# Patient Record
Sex: MALE | Race: WHITE | NOT HISPANIC OR LATINO
[De-identification: names, ages, dates, MRNs, and addresses within clinical notes are randomized per-mention and may not be internally consistent; named-entity substitution may affect disease eponyms.]

---

## 2018-03-19 PROBLEM — Z00.00 ENCOUNTER FOR PREVENTIVE HEALTH EXAMINATION: Status: ACTIVE | Noted: 2018-03-19

## 2018-04-18 ENCOUNTER — APPOINTMENT (OUTPATIENT)
Dept: GASTROENTEROLOGY | Facility: CLINIC | Age: 23
End: 2018-04-18
Payer: COMMERCIAL

## 2018-04-18 VITALS
DIASTOLIC BLOOD PRESSURE: 88 MMHG | HEART RATE: 71 BPM | HEIGHT: 72 IN | BODY MASS INDEX: 32.37 KG/M2 | RESPIRATION RATE: 18 BRPM | WEIGHT: 239 LBS | SYSTOLIC BLOOD PRESSURE: 124 MMHG

## 2018-04-18 DIAGNOSIS — Z82.2 FAMILY HISTORY OF DEAFNESS AND HEARING LOSS: ICD-10-CM

## 2018-04-18 DIAGNOSIS — K21.9 GASTRO-ESOPHAGEAL REFLUX DISEASE W/OUT ESOPHAGITIS: ICD-10-CM

## 2018-04-18 DIAGNOSIS — Z91.89 OTHER SPECIFIED PERSONAL RISK FACTORS, NOT ELSEWHERE CLASSIFIED: ICD-10-CM

## 2018-04-18 DIAGNOSIS — Z87.19 PERSONAL HISTORY OF OTHER DISEASES OF THE DIGESTIVE SYSTEM: ICD-10-CM

## 2018-04-18 DIAGNOSIS — Z87.898 PERSONAL HISTORY OF OTHER SPECIFIED CONDITIONS: ICD-10-CM

## 2018-04-18 DIAGNOSIS — F17.200 NICOTINE DEPENDENCE, UNSPECIFIED, UNCOMPLICATED: ICD-10-CM

## 2018-04-18 PROCEDURE — 99204 OFFICE O/P NEW MOD 45 MIN: CPT

## 2018-04-18 RX ORDER — RANITIDINE 300 MG/1
300 TABLET ORAL DAILY
Qty: 90 | Refills: 1 | Status: ACTIVE | COMMUNITY
Start: 2018-04-18 | End: 1900-01-01

## 2018-06-28 ENCOUNTER — RESULT REVIEW (OUTPATIENT)
Age: 23
End: 2018-06-28

## 2018-06-28 ENCOUNTER — OUTPATIENT (OUTPATIENT)
Dept: OUTPATIENT SERVICES | Facility: HOSPITAL | Age: 23
LOS: 1 days | End: 2018-06-28
Payer: COMMERCIAL

## 2018-06-28 ENCOUNTER — APPOINTMENT (OUTPATIENT)
Dept: GASTROENTEROLOGY | Facility: GI CENTER | Age: 23
End: 2018-06-28
Payer: COMMERCIAL

## 2018-06-28 DIAGNOSIS — K21.9 GASTRO-ESOPHAGEAL REFLUX DISEASE WITHOUT ESOPHAGITIS: ICD-10-CM

## 2018-06-28 PROCEDURE — 88342 IMHCHEM/IMCYTCHM 1ST ANTB: CPT

## 2018-06-28 PROCEDURE — 88342 IMHCHEM/IMCYTCHM 1ST ANTB: CPT | Mod: 26

## 2018-06-28 PROCEDURE — 43239 EGD BIOPSY SINGLE/MULTIPLE: CPT

## 2018-06-28 PROCEDURE — 88305 TISSUE EXAM BY PATHOLOGIST: CPT | Mod: 26

## 2018-06-28 PROCEDURE — 88305 TISSUE EXAM BY PATHOLOGIST: CPT

## 2018-07-05 LAB — SURGICAL PATHOLOGY FINAL REPORT - CH: SIGNIFICANT CHANGE UP

## 2018-09-10 ENCOUNTER — EMERGENCY (EMERGENCY)
Facility: HOSPITAL | Age: 23
LOS: 1 days | Discharge: HOME | End: 2018-09-10
Attending: EMERGENCY MEDICINE
Payer: COMMERCIAL

## 2018-09-10 VITALS
RESPIRATION RATE: 18 BRPM | OXYGEN SATURATION: 99 % | HEART RATE: 80 BPM | SYSTOLIC BLOOD PRESSURE: 147 MMHG | DIASTOLIC BLOOD PRESSURE: 88 MMHG | TEMPERATURE: 98 F

## 2018-09-10 VITALS — WEIGHT: 229.94 LBS

## 2018-09-10 PROCEDURE — 73610 X-RAY EXAM OF ANKLE: CPT | Mod: 26,LT

## 2018-09-10 PROCEDURE — 99283 EMERGENCY DEPT VISIT LOW MDM: CPT

## 2018-09-10 PROCEDURE — 73610 X-RAY EXAM OF ANKLE: CPT

## 2018-09-10 RX ORDER — IBUPROFEN 200 MG
600 TABLET ORAL ONCE
Qty: 0 | Refills: 0 | Status: COMPLETED | OUTPATIENT
Start: 2018-09-10 | End: 2018-09-10

## 2018-09-10 RX ADMIN — Medication 600 MILLIGRAM(S): at 20:17

## 2018-09-10 NOTE — ED PROVIDER NOTE - ATTENDING CONTRIBUTION TO CARE
seen with acp: well appering young male, NAD; left ankle with midl swelling and ttp over anterior talofib ligament, no malleolar ttp; +neurovasc intact; xrays negative; treat for sprain, OK for d/c

## 2018-09-10 NOTE — ED PROVIDER NOTE - PHYSICAL EXAMINATION
decreased ROM of L ankle.  Moderate swelling of the L ankle.  pulses intact.  capillary refill < 2 seconds.  tenderness to the lateral ankle and posterior ankle.

## 2018-09-10 NOTE — ED ADULT NURSE NOTE - NSIMPLEMENTINTERV_GEN_ALL_ED
Implemented All Universal Safety Interventions:  [place] to call system. Call bell, personal items and telephone within reach. Instruct patient to call for assistance. Room bathroom lighting operational. Non-slip footwear when patient is off stretcher. Physically safe environment: no spills, clutter or unnecessary equipment. Stretcher in lowest position, wheels locked, appropriate side rails in place.

## 2018-09-10 NOTE — ED ADULT NURSE NOTE - OBJECTIVE STATEMENT
Assumed care at 2015.  A+OX4, c/o left ankle pain.  twisted ankle playing soft ball today.  L ankle wrapped in ace with air cast in place by pa here. limited rom in left ankle

## 2018-09-10 NOTE — ED PROVIDER NOTE - OBJECTIVE STATEMENT
Pt is a 22y M with no PMH complaining of L ankle pain s/p playing softball and rolling it yesterday. PT states he has been limping and has tenderness along the lateral malleolus and posterior ankle. He states he heard a "pop" and a "crack". He reports going home and icing it and then taking naproxen yesterday for the pain. NKDA. No foot tenderness. There is moderate swelling of the ankle. No head trauma or LOC.

## 2019-05-27 ENCOUNTER — EMERGENCY (EMERGENCY)
Facility: HOSPITAL | Age: 24
LOS: 1 days | Discharge: HOME | End: 2019-05-27
Attending: EMERGENCY MEDICINE
Payer: COMMERCIAL

## 2019-05-27 VITALS
HEART RATE: 99 BPM | TEMPERATURE: 98 F | SYSTOLIC BLOOD PRESSURE: 130 MMHG | OXYGEN SATURATION: 97 % | WEIGHT: 225.09 LBS | RESPIRATION RATE: 18 BRPM | DIASTOLIC BLOOD PRESSURE: 74 MMHG | HEIGHT: 72 IN

## 2019-05-27 PROCEDURE — 99283 EMERGENCY DEPT VISIT LOW MDM: CPT

## 2019-05-27 PROCEDURE — 73610 X-RAY EXAM OF ANKLE: CPT

## 2019-05-27 PROCEDURE — 73610 X-RAY EXAM OF ANKLE: CPT | Mod: 26,RT

## 2019-05-27 RX ORDER — IBUPROFEN 200 MG
600 TABLET ORAL ONCE
Refills: 0 | Status: COMPLETED | OUTPATIENT
Start: 2019-05-27 | End: 2019-05-27

## 2019-05-27 RX ADMIN — Medication 600 MILLIGRAM(S): at 16:23

## 2019-05-27 NOTE — ED ADULT TRIAGE NOTE - CHIEF COMPLAINT QUOTE
Pt ambulatory in ED c/o right ankle pain s/p playing softball, states he was walking and stepped on a mitt, rolling his ankle the wrong way.

## 2019-05-27 NOTE — ED PROVIDER NOTE - ATTENDING CONTRIBUTION TO CARE
I, Raven Perez, performed a face to face bedside interview with this patient regarding history of present illness, review of symptoms and relevant past medical, social and family history.  I completed an independent physical examination. Medical decision making, follow-up on ordered tests (ie labs, radiologic studies) and re-evaluation of the patient's status has been communicated to the ACP.  Disposition of the patient will be based on test outcome and response to ED interventions.

## 2019-05-27 NOTE — ED PROVIDER NOTE - PROGRESS NOTE DETAILS
PA note: No acute fx on xray. Velcro ankle cast for ankle sprain. RICE therapy, NSAIDS for pain. Pt given crutches to assist in ambulation. Pt given f/u information for Dr. Fernando

## 2019-05-27 NOTE — ED PROVIDER NOTE - OBJECTIVE STATEMENT
Pt is a 22 y/o M, NO PMH, presents to ED c/o R ankle pain s/p fall. Pt states he was playing softball 1 hour PTA when he stepped on a baseball mitt and inverted his R ankle. Pt states he fell onto the ground after twisting ankle. Pt sustained no other injuries, did not strike his head, and is only c/o R ankle pain at this time. Pt states he has swelling to the R ankle and pain with ambulation. Pt has not taken any medication in attempt to alleviate his symptoms. Pt denies paresthesias, loss of function of leg, cold extremity.

## 2019-05-27 NOTE — ED PROVIDER NOTE - PHYSICAL EXAMINATION
MSK: TTP b/l malleolus R ankle. + inversion / eversion. 5/5 str b/l lower extrems.    Vasc: Cap refill < 2 secs, + 2 pedal pulses, foot warm    Skin: Ecchymosis R ankle.

## 2019-06-07 ENCOUNTER — APPOINTMENT (OUTPATIENT)
Dept: ORTHOPEDIC SURGERY | Facility: CLINIC | Age: 24
End: 2019-06-07
Payer: COMMERCIAL

## 2019-06-07 VITALS
HEIGHT: 72 IN | WEIGHT: 225 LBS | HEART RATE: 80 BPM | DIASTOLIC BLOOD PRESSURE: 80 MMHG | SYSTOLIC BLOOD PRESSURE: 120 MMHG | BODY MASS INDEX: 30.48 KG/M2

## 2019-06-07 DIAGNOSIS — S93.401A SPRAIN OF UNSPECIFIED LIGAMENT OF RIGHT ANKLE, INITIAL ENCOUNTER: ICD-10-CM

## 2019-06-07 PROCEDURE — 99203 OFFICE O/P NEW LOW 30 MIN: CPT

## 2019-06-07 NOTE — DISCUSSION/SUMMARY
[de-identified] : The patient is a 23-year-old male 11 days status post right ankle sprain. Symptoms are 80% improved since onset. At this point we will continue with conservative treatment. He is going to continue with the use of boots and ankle support. He will modify activities accordingly. He is going to begin a course of physical therapy. Should symptoms not resolve with these conservative measures he was advised to followup with a foot and ankle specialist. He notes good understanding and agreement with the plan of care.

## 2019-06-07 NOTE — HISTORY OF PRESENT ILLNESS
[de-identified] : This 23-year-old male presents for evaluation of his right ankle. He reports on May 27, 2019 he had been playing softball when he slipped on a baseball mitt and invert his ankle. After twisting the ankle he did fall to the ground. He noted immediate swelling and pain with weightbearing. He noted bruising. He was seen at [facility] emergency room. X-rays were negative for fracture. He was diagnosed with a right ankle sprain. He was given a Velcro ankle cast and advised rice, rest and the use of crutches. He presents today for followup. He is now 11 days status post injury. He reports 80% improvement in his symptoms. At this time he does note pain that he describes as 1-2/10 on the pain scale over the medial malleolus. This is most notable at the end of the day of activities. He discontinued the crutches and the use of the Aircast. He has been wearing boots with an ankle support. He denies limping. He has been taking ibuprofen 600 mg p.o. approximately one time per day. He did see his primary care provider on May 29 and was sent for a right ankle MRI. Results are available for review.

## 2019-06-07 NOTE — PHYSICAL EXAM
[de-identified] : X-ray of the right ankle taken May 27, 2019 at [facility] emergency room shows no acute radiographic osseus pathology. Lateral soft tissue swelling.\par \par MRI of the right ankle done at [facility] on May 31, 2019 shows moderate anterior tibiofibular and calcaneofibular ligament sprain. Complete anterior talofibular ligament tear. Reactive joint effusions and mild peroneal tenosynovitis. Soft tissue edema.Os trigonum. [de-identified] : The right ankle is without erythema, abrasions, or ecchymosis. Mild pain is noted with palpation over the medial malleolus and posterior to the medial malleolus. No pain with palpation over the lateral malleolus. Minimal swelling. Functional range of active motion. He notes no pain with range of motion. Squeeze test is negative. No gross instability. Neurovascular status is intact. He is able to transfer and ambulate independently with no limp. [de-identified] : The patient appears well nourished  and in no apparent distress.  The patient is alert and oriented to person, place, and time.   Affect and mood appear normal.    The head is normocephalic and atraumatic.  The eyes reveal normal sclera and extra ocular muscles are intact. The tongue is midline with no apparent lesions.  Skin shows normal turgor with no evidence of eczema or psoriasis.  No respiratory distress noted.  Sensation grossly intact.\par

## 2022-04-08 NOTE — ED ADULT TRIAGE NOTE - NSWEIGHTCALCTOOLDRUG_GEN_A_CORE
Patient's colonoscopy and EGD have been rescheduled.  Patient is now scheduled for 7/21/22 at [facility] with Dr Treviño.    GI Lab and pre-cert updated.    used

## 2025-03-03 NOTE — ED ADULT TRIAGE NOTE - LOCATION:
[FreeTextEntry1] : 53M with HLD on atorastatin, here for evaluation of joint pains which started in 8/2024, suspected gout.  There is concern for gout based on history (2+ flareups this past year, and frequent beer consumption) patient advised to limit intake of purine rich foods including alcohol, red meat, shellfish, turkey, high fructose corn syrup beverages. will start allopurinol 100 mg QD and colchicine 0.6 mg QD for prophylaxis until uric acid reaches goal of <6 for at least 3 months.  will also check labs for RA, SLE, Sjogrens due to joint pains and reported family history of autoimmune disease. Suspicion is lower for these conditions.  follow up in 6 weeks. 
[FreeTextEntry1] : 53M with HLD on atorastatin, here for evaluation of joint pains which started in 8/2024, suspected gout.  There is concern for gout based on history (2+ flareups this past year, and frequent beer consumption) patient advised to limit intake of purine rich foods including alcohol, red meat, shellfish, turkey, high fructose corn syrup beverages. will start allopurinol 100 mg QD and colchicine 0.6 mg QD for prophylaxis until uric acid reaches goal of <6 for at least 3 months.  will also check labs for RA, SLE, Sjogrens due to joint pains and reported family history of autoimmune disease. Suspicion is lower for these conditions.  follow up in 6 weeks. 
Left arm;